# Patient Record
Sex: FEMALE | URBAN - METROPOLITAN AREA
[De-identification: names, ages, dates, MRNs, and addresses within clinical notes are randomized per-mention and may not be internally consistent; named-entity substitution may affect disease eponyms.]

---

## 2024-10-30 ENCOUNTER — OFFICE VISIT (OUTPATIENT)
Dept: URBAN - METROPOLITAN AREA CLINIC 109 | Facility: CLINIC | Age: 44
End: 2024-10-30

## 2024-12-03 ENCOUNTER — DASHBOARD ENCOUNTERS (OUTPATIENT)
Age: 44
End: 2024-12-03

## 2024-12-03 ENCOUNTER — OFFICE VISIT (OUTPATIENT)
Dept: URBAN - METROPOLITAN AREA CLINIC 94 | Facility: CLINIC | Age: 44
End: 2024-12-03
Payer: OTHER GOVERNMENT

## 2024-12-03 VITALS
BODY MASS INDEX: 44.65 KG/M2 | WEIGHT: 252 LBS | HEIGHT: 63 IN | HEART RATE: 82 BPM | DIASTOLIC BLOOD PRESSURE: 87 MMHG | SYSTOLIC BLOOD PRESSURE: 148 MMHG | TEMPERATURE: 98.8 F

## 2024-12-03 DIAGNOSIS — R14.0 ABDOMINAL BLOATING: ICD-10-CM

## 2024-12-03 DIAGNOSIS — K59.04 CHRONIC IDIOPATHIC CONSTIPATION: ICD-10-CM

## 2024-12-03 PROBLEM — 82934008: Status: ACTIVE | Noted: 2024-12-03

## 2024-12-03 PROCEDURE — 99204 OFFICE O/P NEW MOD 45 MIN: CPT | Performed by: INTERNAL MEDICINE

## 2024-12-03 RX ORDER — AMLODIPINE BESYLATE 2.5 MG/1
1 TABLET TABLET ORAL ONCE A DAY
Status: ACTIVE | COMMUNITY

## 2024-12-03 NOTE — HPI-TODAY'S VISIT:
43 y/o F hx of HTN, Hashimoto's hypothyroidism presents for NP eval of constipation.   Admits to lifelong hx of constipation with BMs 1-3x/wk and occasionally longer. Constipation will lead to significant abd pain and bloating which leads to fullness feeling. Stools are hard and require straining. Has attempt many OTC treatments and typically relies on occasional rescue laxative use or will eat certain fast food to assist BMs. Notes abd bloating with PO intake but seems to be mostly related to bowel habits. Denies any diarrhea, blood or mucus in the stool. Denies epigastric pain, n/v, reflux, dysphagia. Some report of feeling full fast she relates to abd bloating, constipation. Has been on Saxenda for 3 mo but stopped in Oct w/o improvement in this sx and this was ongoing prior to medication.   No FMHx of GI malig. No prior EGD/CLS

## 2024-12-05 LAB — H PYLORI BREATH TEST: NOT DETECTED

## 2025-02-03 ENCOUNTER — OFFICE VISIT (OUTPATIENT)
Dept: URBAN - METROPOLITAN AREA CLINIC 94 | Facility: CLINIC | Age: 45
End: 2025-02-03
Payer: OTHER GOVERNMENT

## 2025-02-03 VITALS
DIASTOLIC BLOOD PRESSURE: 92 MMHG | OXYGEN SATURATION: 95 % | HEIGHT: 63 IN | TEMPERATURE: 98 F | SYSTOLIC BLOOD PRESSURE: 167 MMHG | HEART RATE: 89 BPM | WEIGHT: 253 LBS | BODY MASS INDEX: 44.83 KG/M2

## 2025-02-03 DIAGNOSIS — K59.04 CHRONIC IDIOPATHIC CONSTIPATION: ICD-10-CM

## 2025-02-03 PROCEDURE — 99214 OFFICE O/P EST MOD 30 MIN: CPT | Performed by: INTERNAL MEDICINE

## 2025-02-03 RX ORDER — AMLODIPINE BESYLATE 2.5 MG/1
1 TABLET TABLET ORAL ONCE A DAY
Status: ACTIVE | COMMUNITY

## 2025-02-03 RX ORDER — LINACLOTIDE 72 UG/1
1 CAPSULE AT LEAST 30 MINUTES BEFORE THE FIRST MEAL OF THE DAY ON AN EMPTY STOMACH CAPSULE, GELATIN COATED ORAL ONCE A DAY
Qty: 30 | Refills: 5 | OUTPATIENT
Start: 2025-02-03 | End: 2025-08-02

## 2025-02-03 RX ORDER — LEVOTHYROXINE SODIUM 25 UG/1
1 TABLET IN THE MORNING ON AN EMPTY STOMACH TABLET ORAL ONCE A DAY
Status: ACTIVE | COMMUNITY

## 2025-02-03 NOTE — HPI-TODAY'S VISIT:
45 y/o F hx of HTN, Hashimoto's hypothyroidism presents for chronic constipation f/u.  She reports constipation has improved. She will still have occ LLQ pain with significant constipation but this has improved. She also reports low back pain has improved as her constipation has. She tried linzess samples and felt 72 to be most helpful.    LAST OV Admits to lifelong hx of constipation with BMs 1-3x/wk and occasionally longer. Constipation will lead to significant abd pain and bloating which leads to fullness feeling. Stools are hard and require straining. Has attempt many OTC treatments and typically relies on occasional rescue laxative use or will eat certain fast food to assist BMs. Notes abd bloating with PO intake but seems to be mostly related to bowel habits. Denies any diarrhea, blood or mucus in the stool. Denies epigastric pain, n/v, reflux, dysphagia. Some report of feeling full fast she relates to abd bloating, constipation. Has been on Saxenda for 3 mo but stopped in Oct w/o improvement in this sx and this was ongoing prior to medication.   No FMHx of GI malig. No prior EGD/CLS

## 2025-08-04 ENCOUNTER — OFFICE VISIT (OUTPATIENT)
Dept: URBAN - METROPOLITAN AREA CLINIC 94 | Facility: CLINIC | Age: 45
End: 2025-08-04

## 2025-08-15 ENCOUNTER — OFFICE VISIT (OUTPATIENT)
Dept: URBAN - METROPOLITAN AREA CLINIC 94 | Facility: CLINIC | Age: 45
End: 2025-08-15

## 2025-08-15 RX ORDER — LEVOTHYROXINE SODIUM 25 UG/1
1 TABLET IN THE MORNING ON AN EMPTY STOMACH TABLET ORAL ONCE A DAY
COMMUNITY

## 2025-08-15 RX ORDER — AMLODIPINE BESYLATE 2.5 MG/1
1 TABLET TABLET ORAL ONCE A DAY
COMMUNITY